# Patient Record
Sex: FEMALE | Race: WHITE | Employment: STUDENT | URBAN - METROPOLITAN AREA
[De-identification: names, ages, dates, MRNs, and addresses within clinical notes are randomized per-mention and may not be internally consistent; named-entity substitution may affect disease eponyms.]

---

## 2019-12-27 ENCOUNTER — OFFICE VISIT (OUTPATIENT)
Dept: FAMILY MEDICINE | Facility: CLINIC | Age: 19
End: 2019-12-27
Payer: COMMERCIAL

## 2019-12-27 VITALS
OXYGEN SATURATION: 99 % | WEIGHT: 153 LBS | HEART RATE: 88 BPM | HEIGHT: 69 IN | SYSTOLIC BLOOD PRESSURE: 122 MMHG | TEMPERATURE: 98.1 F | BODY MASS INDEX: 22.66 KG/M2 | DIASTOLIC BLOOD PRESSURE: 68 MMHG

## 2019-12-27 DIAGNOSIS — Z23 NEED FOR PROPHYLACTIC VACCINATION AND INOCULATION AGAINST INFLUENZA: ICD-10-CM

## 2019-12-27 DIAGNOSIS — L70.0 ACNE VULGARIS: ICD-10-CM

## 2019-12-27 DIAGNOSIS — Z30.46 ENCOUNTER FOR NEXPLANON REMOVAL: Primary | ICD-10-CM

## 2019-12-27 DIAGNOSIS — Z11.3 SCREEN FOR STD (SEXUALLY TRANSMITTED DISEASE): ICD-10-CM

## 2019-12-27 DIAGNOSIS — B36.0 TINEA VERSICOLOR: ICD-10-CM

## 2019-12-27 DIAGNOSIS — Z30.011 ENCOUNTER FOR INITIAL PRESCRIPTION OF CONTRACEPTIVE PILLS: ICD-10-CM

## 2019-12-27 LAB — HCG UR QL: NEGATIVE

## 2019-12-27 PROCEDURE — 81025 URINE PREGNANCY TEST: CPT | Performed by: PHYSICIAN ASSISTANT

## 2019-12-27 PROCEDURE — 87591 N.GONORRHOEAE DNA AMP PROB: CPT | Performed by: PHYSICIAN ASSISTANT

## 2019-12-27 PROCEDURE — 87491 CHLMYD TRACH DNA AMP PROBE: CPT | Performed by: PHYSICIAN ASSISTANT

## 2019-12-27 PROCEDURE — 90471 IMMUNIZATION ADMIN: CPT | Performed by: PHYSICIAN ASSISTANT

## 2019-12-27 PROCEDURE — 90686 IIV4 VACC NO PRSV 0.5 ML IM: CPT | Performed by: PHYSICIAN ASSISTANT

## 2019-12-27 PROCEDURE — 99213 OFFICE O/P EST LOW 20 MIN: CPT | Mod: 25 | Performed by: PHYSICIAN ASSISTANT

## 2019-12-27 PROCEDURE — 11982 REMOVE DRUG IMPLANT DEVICE: CPT | Performed by: PHYSICIAN ASSISTANT

## 2019-12-27 RX ORDER — LEVONORGESTREL/ETHIN.ESTRADIOL 0.1-0.02MG
1 TABLET ORAL DAILY
Qty: 90 TABLET | Refills: 3 | Status: SHIPPED | OUTPATIENT
Start: 2019-12-27 | End: 2020-10-01

## 2019-12-27 RX ORDER — KETOCONAZOLE 20 MG/ML
SHAMPOO TOPICAL PRN
Qty: 120 ML | Refills: 1 | Status: SHIPPED | OUTPATIENT
Start: 2019-12-27

## 2019-12-27 ASSESSMENT — MIFFLIN-ST. JEOR: SCORE: 1523.38

## 2019-12-27 NOTE — LETTER
January 2, 2020      Italia Barrera  [address] [facility] [address]        Dear ,    We are writing to inform you of your test results.    -Chlamydia and gonnohrea tests are normal.    Resulted Orders   HCG qualitative urine   Result Value Ref Range    HCG Qual Urine Negative NEG^Negative      Comment:      This test is for screening purposes.  Results should be interpreted along with   the clinical picture.  Confirmation testing is available if warranted by   ordering [id number], HCG Quantitative Pregnancy.     Neisseria gonorrhoeae PCR   Result Value Ref Range    Specimen Descrip Urine     N Gonorrhea PCR Negative NEG^Negative      Comment:      Negative for N. gonorrhoeae rRNA by transcription mediated amplification.  A negative result by transcription mediated amplification does not preclude   the presence of N. gonorrhoeae infection because results are dependent on   proper and adequate collection, absence of inhibitors, and sufficient rRNA to   be detected.     Chlamydia trachomatis PCR   Result Value Ref Range    Specimen Description Urine     Chlamydia Trachomatis PCR Negative NEG^Negative      Comment:      Negative for C. trachomatis rRNA by transcription mediated amplification.  A negative result by transcription mediated amplification does not preclude   the presence of C. trachomatis infection because results are dependent on   proper and adequate collection, absence of inhibitors, and sufficient rRNA to   be detected.         If you have any questions or concerns, please call the clinic at the number listed above.       Sincerely,        Roxi Wolf PA-C

## 2019-12-27 NOTE — RESULT ENCOUNTER NOTE
Result(s) was/were reviewed in the clinic with patient at time of appointment.  Electronically Signed By: Roxi Wolf PA-C

## 2019-12-27 NOTE — PATIENT INSTRUCTIONS
Nexplanon removed.  Switch to birth control pills.   Notify of GC results when available.   Ok to use topical anti-fungal shampoo for recurrent tinea. Can refer to skin clinic next time your home if this does not help resolve it.

## 2019-12-27 NOTE — PROGRESS NOTES
"  HPI   CC: removal of Nexplanon    HPI:   Italia Barrera is a 19 year old No obstetric history on file. who presents to clinic today to have her Nexplanon removed. It was inserted on 12/28/2016 in her right side arm. She desires Nexplanon removal because of ongoing break-through bleeding and spotting. She has used OCPs for contraception in the past and she would like to resume this instead.    No hx of liver disease or migraine with aura.  Fhx no breast cancer or DVT  LMP: pt not sure when the last date was - estimates the beginning of Nov.   Sexually active with 1 male partner for the past 8-9 months or so. Amenable to GC screening as has not had this completed since being with this person.  Currently living in [place], but prefers to do her care in [place] so came in while home for the holidays.    Also requesting treatment for recurrent tinea versicolor. Has had chronically now for months. Seemed to resolve temporarily, but then returned upon moving to [place] and being in hot environment again. Has tried topical OTC selsun blue, but nothing else.      Reviewed PMH, PSH, social and family history. Changes made in EPIC.     ROS: 10 point ROS neg other than the symptoms noted above in the HPI.    OBJECTIVE:  Vitals:    12/27/19 1541   BP: 122/68   BP Location: Right arm   Patient Position: Sitting   Cuff Size: Adult Regular   Pulse: 88   Temp: 98.1  F (36.7  C)   TempSrc: Oral   SpO2: 99%   Weight: 69.4 kg (153 lb)   Height: 1.753 m (5' 9\")       Exam:  Constitutional: healthy, alert and no distress  Respiratory: negative, Percussion normal. Good diaphragmatic excursion.  Psychiatric: mentation appears normal and affect normal/bright  SKIN: scattered varyingly sized hypopigmented macules that coalesce across trunk primarily including chest and back c/w tinea versicolor.    PROCEDURE:  Verbal and written consent obtained. Discussed risk of bleeding, infection, inability to remove device and bruising. Nexplanon device was " palpated in her right side arm. The area was cleansed with betadine x3. Sterile gloves were donned. Then lidocaine 1% was injected under the skin at the distal end of the device. A 2mm incision was made with a scalpel over the distal end of the device, then the device was grasped with a sterile Gladys clamp. The fibrous sheath encasing the Nexplanon was incised with a scalpel, then the device was removed without difficulty, intact. The incision was covered with a steri-strip, sterile guaze and the arm was wrapped with a pressure dressing. Patient tolerated procedure well.     Results for orders placed or performed in visit on 12/27/19   HCG qualitative urine     Status: None   Result Value Ref Range    HCG Qual Urine Negative NEG^Negative     ASSESSMENT:  Italia Barrera is a 19 year old No obstetric history on file. who had Nexplanon removed today.     PLAN:   1. Screen for STD (sexually transmitted disease)  No screening since new partner. Notify once results available.  - Neisseria gonorrhoeae PCR  - Chlamydia trachomatis PCR    2. Encounter for Nexplanon removal  Tolerated well without complication.    3. Encounter for initial prescription of contraceptive pills  Pt previously did well on combo OCPs and would like to switch back to these. Risks/benefits/appropriate use reviewed. Due for preventative physical and pap. She will consider when she is back home again in 2 months.  - HCG qualitative urine    4. Tinea versicolor  Chronic, recurrent. Will trial topical ketoconazole and reviewed preventative maintenance with monthly treatment to see if this helps resolve for longer periods. Referral also given for skin clinic to consider next time she is back at home.  - ketoconazole (NIZORAL) 2 % external shampoo; Apply topically as needed for other (rash) To affected skin then wash off after 5 minutes for 3 days then apply to body for 10 minutes once per month to prevent recurrence  Dispense: 120 mL; Refill: 1  -  DERMATOLOGY REFERRAL    5. Acne vulgaris  Helped with her acne previously so hopefully will improve for her again.  - levonorgestrel-ethinyl estradiol (AVIANE/ALESSE/LESSINA) 0.1-20 MG-MCG tablet; Take 1 tablet by mouth daily  Dispense: 90 tablet; Refill: 3    6. Need for prophylactic vaccination and inoculation against influenza    - INFLUENZA VACCINE IM > 6 MONTHS VALENT IIV4 [61905]  - Vaccine Administration, Initial [39806]    Patient to return to clinic for annual exam, sooner PRN.    Roxi Wolf PA-C

## 2019-12-28 PROBLEM — B36.0 TINEA VERSICOLOR: Status: ACTIVE | Noted: 2019-12-28

## 2019-12-28 PROBLEM — Z30.011 ENCOUNTER FOR INITIAL PRESCRIPTION OF CONTRACEPTIVE PILLS: Status: ACTIVE | Noted: 2019-12-28

## 2019-12-31 LAB
C TRACH DNA SPEC QL NAA+PROBE: NEGATIVE
N GONORRHOEA DNA SPEC QL NAA+PROBE: NEGATIVE
SPECIMEN SOURCE: NORMAL
SPECIMEN SOURCE: NORMAL

## 2020-01-01 NOTE — RESULT ENCOUNTER NOTE
Please call or write patient with the following results:    Dear Italia,    Your recent lab results are noted below:    -Chlamydia and gonnohrea tests are normal.    For additional lab test information, [email or web address] is an excellent reference.  Please contact the clinic at [phone] with any further questions or concerns.      Thank you,      Roxi Wolf PA-C  [facility]

## 2020-09-30 DIAGNOSIS — L70.0 ACNE VULGARIS: ICD-10-CM

## 2020-09-30 RX ORDER — LEVONORGESTREL/ETHIN.ESTRADIOL 0.1-0.02MG
TABLET ORAL
Qty: 84 TABLET | Refills: 0 | OUTPATIENT
Start: 2020-09-30

## 2020-10-01 DIAGNOSIS — L70.0 ACNE VULGARIS: ICD-10-CM

## 2020-10-01 RX ORDER — LEVONORGESTREL/ETHIN.ESTRADIOL 0.1-0.02MG
1 TABLET ORAL DAILY
Qty: 90 TABLET | Refills: 0 | Status: SHIPPED | OUTPATIENT
Start: 2020-10-01

## 2020-10-01 NOTE — TELEPHONE ENCOUNTER
Medication Question or Refill    Who is calling: Pt    What medication are you calling about (include dose and sig)?: levonorgestrel-ethinyl estradiol (AVIANE/ALESSE/LESSINA) 0.1-20 MG-MCG tablet    Controlled Substance Agreement on file: No    Who prescribed the medication?: na    Do you need a refill? Yes:     When did you use the medication last? na    Patient offered an appointment? No    Do you have any questions or concerns?  Pt states she had to have filled early-- Misplaced two packets or did not receive   Pt would like filled asap    Requested Pharmacy: [facility]    [place]     Okay to leave a detailed message?: Yes at Cell number on file:    Telephone Information:   Mobile [phone]